# Patient Record
Sex: FEMALE | Race: WHITE | Employment: OTHER | URBAN - NONMETROPOLITAN AREA
[De-identification: names, ages, dates, MRNs, and addresses within clinical notes are randomized per-mention and may not be internally consistent; named-entity substitution may affect disease eponyms.]

---

## 2022-07-19 NOTE — PROGRESS NOTES
[facility] initial consultation note    Karen Kirby                                                Chief complaint: Karen Kirby is a 61 y. o.oldfemale came for further evaluation regarding her sleep apnea  with referral from Ms. Mauricio Guevara,    Patient underwent sleep studies at [facility] AT THE [facility] sleep lab. [place]:    Sleep/Wake schedule:  Usual time to go to bed during the work/regular day of week: 10 Am to 11AM  Usual time to wake up during the work//regular day of week : 7AM  Over the weekends her sleep schedule:   [x]phase delayed- Occasionally. She usually falls a sleep in less than: Varies from 30 to 60minutes  She takes naps: Yes. Number of naps per week:  2 to 3 times  During each nap she spends a total of:  1hour  The naps were reported as refreshing: Yes    Sleep Hygiene:    Is the temperature and evironment in her bed room is acceptable to her: Yes. She watches Television in her bed room: No.  She read books, study, pay bills etc in the bed: No.  Frequency She wake up during night/sleep: 1 to 2  Majority of nocturnal awakenings are for urination: Yes. Difficulty in falling back to sleep after nocturnal awakenings: No    Do you drink coffee: She rarely drinks 1 cup of coffee in the morning. She drinks 1 cup/week on average. Do you drink caffeinated beverages i.e sodas: No.    Do you drink tea: Yes. Occasionally she drinks green tea. Do you drink alcoholic beverages: She drinks alcohol rarely I.e 1-2 drinks per weekend      History of recreational drug use: No.     Social History     Tobacco Use    Smoking status: Never    Smokeless tobacco: Never   Substance Use Topics    Alcohol use: Yes     Comment: Occasional    Drug use: No       Sleep apnea symptoms:    Shewas diagnosed with sleep apnea in the past.  Please see diagnostic data section for details.     History of headaches in the morning:No.  History of dry mouth in the morning: Yes. She is currently using ResMed fullface mask. She is using AirFit F 20 small size fullface mask. History of palpitations during night time/nocturnal awakenings: No.  History of sweating during night time/nocturnal awakenings: Yes- Occasionally    General:  History of head injury in the past: No.    History of seizures: NO.  Rest less legs syndrome symptoms:NO  History suggestive of periodic limb movements during sleep: NO  History suggestive of hypnagogic hallucinations: NO  History suggestive of hypnopompic hallucinations: NO  History suggestive of sleep talking: NO  History suggestive of sleep walking:NO  History suggestive of bruxism: NO   History suggestive of cataplexy: NO  History suggestive of sleep paralysis:NO    Family history of sleep disorders:  Family history of obstructive sleep apnea: Yes. Her mother was diagnosed with obstructive sleep apnea. Her mother used to be on treatment with a CPAP device. She is currently not using her CPAP device. Her brother was also diagnosed with obstructive sleep apnea. He is currently using a CPAP machine. Family history of Narcolepsy: No.   Family history of Rest less legs syndrome : No.       History regarding old sleep studies:  Prior history of sleep study: Yes. Please see the diagnostic data section for details. Using CPAP device: No.  Currently using home Oxygen: NO.      Patient considerations:  Is the patient is ambulatory: Yes  Patient is currently using: None of these Wheelchair, Lynnda Leventhal or Tashia Keita. Para/Quadriplegic: NO  Hearing deficit : NO  Claustrophobic: NO  MDD : NO  Blind: NO  Incontinent: NO  Para/Quadraplegi: NO.   Need transportation to and from Sleep Center:NO    Social History:  Social History     Tobacco Use    Smoking status: Never    Smokeless tobacco: Never   Substance Use Topics    Alcohol use: Yes     Comment: Occasional    Drug use: No   .  She is currently working: Yes.   She works in [employer] with 3 hours/day. 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 Past Medical History:   Diagnosis Date    Hyperlipidemia     Hypertension        Past Surgical History:   Procedure Laterality Date    Ef[place]    COLONOSCOPY  10/21/13    DILATION AND CURETTAGE OF UTERUS      UPPER GASTROINTESTINAL ENDOSCOPY  10/21/13       No Known Allergies    Current Outpatient Medications   Medication Sig Dispense Refill    valsartan (DIOVAN) 80 MG tablet Take 80 mg by mouth daily      montelukast (SINGULAIR) 10 MG tablet Take 10 mg by mouth nightly      Omega 3 1000 MG CAPS Take by mouth      vitamin D3 (CHOLECALCIFEROL) 10 MCG (400 UNIT) TABS tablet Take 400 Units by mouth daily 25mcg      Multiple Vitamin (MULTI-DAY PO) Take by mouth      aspirin 81 MG EC tablet Take 81 mg by mouth daily      Homeopathic Products (RESTFUL LEGS PM SL) Place under the tongue      calcium carbonate (OSCAL) 500 MG TABS tablet Take 600 mg by mouth daily      Aloe Vera 99 % GEL Apply topically      MELATONIN PO Take by mouth      Azelastine HCl (ASTEPRO NA) by Nasal route as needed. Boswellia-Glucosamine-Vit D (OSTEO BI-FLEX/5-LOXIN ADVANCED PO) Take  by mouth daily. omeprazole (PRILOSEC) 40 MG capsule Take 1 capsule by mouth daily for 30 days. 30 capsule 3     No current facility-administered medications for this visit. Family History   Problem Relation Age of Onset    Diabetes Mother     Heart Disease Mother     Heart Disease Father         Review of Systems:    General/Constitutional: She gained 14 pounds of weight from her CPAP titration study performed on 30 December 2021 with a normal appetite. No fever or chills. HENT: Negative. Eyes: Negative. Upper respiratory tract: Frequent nasal stuffiness with post nasal drip. She is currently using Astelin nasal spray twice daily with good compliance.   She used to be on treatment with Nasacort nasal spray from over-the-counter with no improvement in her symptoms. She quit taking Nasacort nasal spray. Lower respiratory tract/ lungs: No cough or sputum production. No hemoptysis. Cardiovascular: No palpitations or chest pain. Gastrointestinal: No nausea or vomiting. Neurological: No focal neurologiacal weakness. Extremities: No edema. Musculoskeletal: No complaints. Genitourinary: No complaints. Hematological: Negative. Psychiatric/Behavioral: Negative. Skin: No itching. /68 (Site: Left Upper Arm, Position: Sitting, Cuff Size: Large Adult)   Pulse 74   Temp 97.8 °F (36.6 °C)   Ht 5' 4\" (1.626 m)   Wt 184 lb (83.5 kg)   SpO2 98% Comment: room air at rest  BMI 31.58 kg/m²   BMI:  Body mass index is 31.58 kg/m². Mallampati airway Class:7  Neck Circumference:.14 Inches  Pax sleepiness score 8/18/22: 10  Sleep apnea quality of life questionnaire: 80    Physical Exam:  Nursing note and vitals reviewed. Constitutional: Patient appears moderately built and moderately nourished. No distress. Patient is oriented to person, place, and time. HENT: Hypertrophied nasal turbinates with pale nasal mucosa bilaterally. Slight deviated nasal septum to the left side. Head: Normocephalic and atraumatic. Right Ear: External ear normal.   Left Ear: External ear normal.   Mouth/Throat: Oropharynx is clear and moist.  No oral thrush. Eyes: Conjunctivae are normal. Pupils are equal, round, and reactive to light. No scleral icterus. Neck: Neck supple. No JVD present. No tracheal deviation present. Cardiovascular: Normal rate, regular rhythm, normal heart sounds. No murmur heard. Pulmonary/Chest: Effort normal and breath sounds normal. No stridor. No respiratory distress. No wheezes. No rales. Patient exhibits no tenderness. Abdominal: Soft. Patient exhibits no distension. No tenderness. Musculoskeletal: Normal range of motion. Extremities: Patient exhibits no edema and no tenderness.    Lymphadenopathy:  No cervical adenopathy. Neurological: Patient is alert and oriented to person, place, and time. Skin: Skin is warm and dry. Patient is not diaphoretic. Psychiatric: Patient  has a normal mood and affect. Patient behavior is normal.     Diagnostic Data:      Sleep test: Performed on 14 December 2021 at [facility] AT THE [facility] sleep lab                            CPAP test: Performed on 10 December 2021                          PAP Download:   Recorded compliance dates:6/11/22-8/15/22  More than 4hour usage compliance was:68.9%. Average residual Apnea- Hypoapnea index on current pressue was:0.8. PAP Type cpap   Level :13  Ramp pressure: 6 cm of water   average usuage hours per day was:.[id number]     Interface: full     Provider:  []GAL             [x]Jaquelin                        []Mary          []George                           [][facility]      []Othe          Assessment:  -She was diagnosed with mild obstructive sleep apnea by a baseline sleep study performed on 14 December 2021. She was subsequently titrated to a CPAP pressure of 13 cm of water. She is currently using CPAP with 13 cm of water pressure. She is using her PAP device with ? poor compliance for >4hours (her download was reported for 90 days out of 90 days that dates were included up to 5 September 2022) but benefiting from it. She denies any clinical symptoms.  -Inadequate sleep hygiene.  -Essential hypertension on treatment medications under control.  -Allergic rhinitis on treatment with Singulair 10 mg p.o. nightly and Astelin nasal spray- not under control  -GERD on treatment with PPI  -Hiatal hernia. -Deviated nasal septum to the left side. Recommendations/Plan:  -Will request download from her DME company for the last 1 month to document her compliance for more than 4 hours.  -Continue Astelin 0.1% solution ( 137mcg/ spray) 2 sprays each nostril bid.  -Start patient on Flonase 50mcg/INH 2sprays each nostril daily.  She  was informed about adverse effects of Flonase. She was demonstrated in my office how to use Flonase. She verbalizes understanding.  -Patient will be given a prescription for chin strap to use with her current mask to avoid leaks and to improve her dryness of mouth.  -Schedule patient for Ear, Nose and Throat specialist consultation with Dr. Zechariah Cuellar Specialist at [facility] as soon as possible for further evaluation of uncontrolled allergic rhinitis with persistent postnasal drip and deviated nasal septum to the left side. She is having difficulty in using her CPAP machine.  -She was advised to continue current positive airway pressure therapy with above described pressure.  -She advised to keep good compliance with current recommended pressure to get optimal results and clinical improvement.  -Follow with my clinic in 3 to 4months for clinical reevaluation with review of download. -She was advised to call [employer] regarding supplies if needed.  -She was advised to practice good sleep hygiene techniques. She was provided with a hand out. -She was advised to loose weight by controlling diet and doing exercise.  -She was instructed to not to drive any motor vehicles or operate heavy equipment if she feels sleepy. -She was advised call my office for earlier appointment if needed for worsening of sleep symptoms.  -Doug Will educated about my impression and plan. Patient verbalizes understanding.      -I personally reviewed updated the Past medical hx, Past surgical hx,Social hx, Family hx, Medications, Allergies in the discrete data section of the patient chart along with labs, Pulmonary medicine,Sleep medicine related, Pathological, Microbiological and Radiological investigations. Addendum done on 8/18/22 at 2:51 PM EDT:  Diagnostic Data:  PAP Download:   Recorded compliance dates: From 17 July 2022- 18 August 2022.   More than 4hour usage compliance was: 93.9%  Average residual Apnea- Hypoapnea index on current pressue was: 0.7      PAP Type: 13 cm of water  Ramp pressure: 6 cm of water    Average usuage hours per day was: 7 hours 43 minutes    Interface: ResMed fullface mask    Provider:  []GAL  [x]Jaquelin  []Mary  []George         [][facility] []Other:       Plan: Will keep above follow-up appointment with my clinic as ordered.

## 2022-08-18 ENCOUNTER — INITIAL CONSULT (OUTPATIENT)
Dept: PULMONOLOGY | Age: 57
End: 2022-08-18
Payer: COMMERCIAL

## 2022-08-18 VITALS
SYSTOLIC BLOOD PRESSURE: 118 MMHG | BODY MASS INDEX: 31.41 KG/M2 | WEIGHT: 184 LBS | OXYGEN SATURATION: 98 % | HEIGHT: 64 IN | HEART RATE: 74 BPM | TEMPERATURE: 97.8 F | DIASTOLIC BLOOD PRESSURE: 68 MMHG

## 2022-08-18 DIAGNOSIS — G47.33 OSA ON CPAP: ICD-10-CM

## 2022-08-18 DIAGNOSIS — G47.10 HYPERSOMNIA: ICD-10-CM

## 2022-08-18 DIAGNOSIS — J34.2 DNS (DEVIATED NASAL SEPTUM): ICD-10-CM

## 2022-08-18 DIAGNOSIS — J30.9 ALLERGIC RHINITIS, UNSPECIFIED SEASONALITY, UNSPECIFIED TRIGGER: Primary | ICD-10-CM

## 2022-08-18 DIAGNOSIS — Z99.89 OSA ON CPAP: ICD-10-CM

## 2022-08-18 PROCEDURE — 99204 OFFICE O/P NEW MOD 45 MIN: CPT | Performed by: INTERNAL MEDICINE

## 2022-08-18 RX ORDER — CALCIUM CARBONATE 500(1250)
600 TABLET ORAL DAILY
COMMUNITY

## 2022-08-18 RX ORDER — MONTELUKAST SODIUM 10 MG/1
10 TABLET ORAL NIGHTLY
COMMUNITY

## 2022-08-18 RX ORDER — FLUTICASONE PROPIONATE 50 MCG
2 SPRAY, SUSPENSION (ML) NASAL DAILY
Qty: 16 G | Refills: 11 | Status: SHIPPED | OUTPATIENT
Start: 2022-08-18 | End: 2023-08-18

## 2022-08-18 RX ORDER — ASPIRIN 81 MG/1
81 TABLET ORAL DAILY
COMMUNITY

## 2022-08-18 RX ORDER — VALSARTAN 80 MG/1
80 TABLET ORAL DAILY
COMMUNITY

## 2022-08-18 RX ORDER — OMEGA-3 FATTY ACIDS/FISH OIL 300-1000MG
CAPSULE ORAL
COMMUNITY

## 2022-08-18 RX ORDER — OMEGA-3S/DHA/EPA/FISH OIL/D3 300MG-1000
400 CAPSULE ORAL DAILY
COMMUNITY

## 2022-08-18 NOTE — PATIENT INSTRUCTIONS
Recommendations/Plan:  -Will request download from her DME company for the last 1 month to document her compliance for more than 4 hours.  -Continue Astelin 0.1% solution ( 137mcg/ spray) 2 sprays each nostril bid.  -Start patient on Flonase 50mcg/INH 2sprays each nostril daily. She  was informed about adverse effects of Flonase. She was demonstrated in my office how to use Flonase. She verbalizes understanding.  -Patient will be given a prescription for chin strap to use with her current mask to avoid leaks and to improve her dryness of mouth.  -Schedule patient for Ear, Nose and Throat specialist consultation with Dr. Sahra Price Specialist at [facility] as soon as possible for further evaluation of uncontrolled allergic rhinitis with persistent postnasal drip and deviated nasal septum to the left side. She is having difficulty in using her CPAP machine.  -She was advised to continue current positive airway pressure therapy with above described pressure.  -She advised to keep good compliance with current recommended pressure to get optimal results and clinical improvement.  -Follow with my clinic in 3 to 4months for clinical reevaluation with review of download. -She was advised to call [employer] regarding supplies if needed.  -She was advised to practice good sleep hygiene techniques. She was provided with a hand out. -She was advised to loose weight by controlling diet and doing exercise.  -She was instructed to not to drive any motor vehicles or operate heavy equipment if she feels sleepy. -She was advised call my office for earlier appointment if needed for worsening of sleep symptoms.  -Althea Vincent educated about my impression and plan. Patient verbalizes understanding.

## 2022-08-18 NOTE — PROGRESS NOTES
Chief Complaint: Yulia Hobbs is a new sleep consult with download     Mallampati airway Class:7  Neck Circumference:.14 Inches    Wesley sleepiness score 8/18/22:   Sleep apnea quality of life questionnaire:      Diagnostic Data:   PAP Download:   Recorded compliance dates:6/11/22-8/15/22  More than 4hour usage compliance was:68.9%. Average residual Apnea- Hypoapnea index on current pressue was:0.8.       PAP Type cpap   Level  6/13     Average usuage hours per day was:.[id number]    Interface: full    Provider:  []-MATTHEW  [x]Jaquelin  []Mary  []George         [][facility] []Other:

## 2022-09-09 DIAGNOSIS — Z99.89 OSA ON CPAP: ICD-10-CM

## 2022-09-09 DIAGNOSIS — G47.10 HYPERSOMNIA: ICD-10-CM

## 2022-09-09 DIAGNOSIS — J30.9 ALLERGIC RHINITIS, UNSPECIFIED SEASONALITY, UNSPECIFIED TRIGGER: ICD-10-CM

## 2022-09-09 DIAGNOSIS — J34.2 DNS (DEVIATED NASAL SEPTUM): ICD-10-CM

## 2022-09-09 DIAGNOSIS — G47.33 OSA ON CPAP: ICD-10-CM

## 2022-09-13 RX ORDER — FLUTICASONE PROPIONATE 50 MCG
SPRAY, SUSPENSION (ML) NASAL
Refills: 11 | OUTPATIENT
Start: 2022-09-13

## 2022-12-11 NOTE — PROGRESS NOTES
[facility] [address] Follow up note    Victor Hugo Mcduffie                                             Chief complaint and [place]: Victor Hugo Mcduffie is a 61 y. o.oldfemale came for follow up regarding her sleep apnea. She is currently using her positive airway pressure device with an auto CPAP with a minimal CPAP pressure of 5cm of water and maximum CPAP pressure of 12 cm of water with Reslex of 3. She is currently using a Reanna device. She denies any problems with machine or mask. She is currently using a nasal mask. She is not using her chinstrap. She is waking up in the morning with a dry eyes due to exhaust air from her mask directed towards her eyes. She is sleeping well at night with out difficulty. She denies any daytime sleepiness. She was evaluated by Dr. Idalia Vergara MD from ENT service. She underwent septoplasty with bilateral turbinoplasties and Clarifix procedure by MD Mindy on 14 November 2022. Review of Systems:   General/Constitutional: she lost 7lbs of weight from the last visit with normal appetite. No fever or chills. HENT: Negative. Eyes: Negative. Upper respiratory tract: No nasal stuffiness or post nasal drip. Lower respiratory tract/ lungs: No cough or sputum production. No hemoptysis. Cardiovascular: No palpitations or chest pain. Gastrointestinal: No nausea or vomiting. Neurological: No focal neurologiacal weakness. Extremities: No edema. Musculoskeletal: No complaints. Genitourinary: No complaints. Hematological: Negative. Psychiatric/Behavioral: Negative. Skin: No itching.       Social History     Tobacco Use    Smoking status: Never    Smokeless tobacco: Never   Substance Use Topics    Alcohol use: Yes     Comment: Occasional    Drug use: No       Social History:  Social History     Tobacco Use    Smoking status: Never    Smokeless tobacco: Never   Substance Use Topics    Alcohol use: Yes     Comment: Occasional Drug use: No   .  She is currently working: Yes. She works in [employer] with 3 hours/day. 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 Past Medical History:   Diagnosis Date    Hyperlipidemia     Hypertension        Past Surgical History:   Procedure Laterality Date    Kerrie Kowalski    COLONOSCOPY  10/21/13    DILATION AND CURETTAGE OF UTERUS      UPPER GASTROINTESTINAL ENDOSCOPY  10/21/13       No Known Allergies    Current Outpatient Medications   Medication Sig Dispense Refill    valsartan (DIOVAN) 80 MG tablet Take 80 mg by mouth daily      Omega 3 1000 MG CAPS Take by mouth      vitamin D3 (CHOLECALCIFEROL) 10 MCG (400 UNIT) TABS tablet Take 400 Units by mouth daily 25mcg      Multiple Vitamin (MULTI-DAY PO) Take by mouth      aspirin 81 MG EC tablet Take 81 mg by mouth daily      Homeopathic Products (RESTFUL LEGS PM SL) Place under the tongue      calcium carbonate (OSCAL) 500 MG TABS tablet Take 600 mg by mouth daily      Aloe Vera 99 % GEL Apply topically      MELATONIN PO Take by mouth      fluticasone (FLONASE) 50 MCG/ACT nasal spray 2 sprays by Nasal route daily 16 g 11    Azelastine HCl (ASTEPRO NA) by Nasal route as needed. [place]well[place]-Glucosamine-Vit D (OSTEO BI-FLEX/5-LOXIN ADVANCED PO) Take  by mouth daily. montelukast (SINGULAIR) 10 MG tablet Take 10 mg by mouth nightly (Patient not taking: Reported on 1/5/2023)       No current facility-administered medications for this visit. Family History   Problem Relation Age of Onset    Diabetes Mother     Heart Disease Mother     Heart Disease Father           /78 (Site: Left Upper Arm, Position: Sitting, Cuff Size: Medium Adult)   Pulse 71   Temp 97.6 °F (36.4 °C)   Ht 5' 3\" (1.6 m)   Wt 177 lb (80.3 kg)   SpO2 100% Comment: room air at rest  BMI 31.35 kg/m²   BMI:  Body mass index is 31.35 kg/m². Mallampati airway Class:4  Neck Circumference:. 14Inches  Kaiden sleepiness score 1/5/23: 3  Sleep apnea quality of life questionnaire:89    Physical Exam:  Nursing note and vitals reviewed. Constitutional: Patient appears moderately built and moderately nourished. No distress. Patient is oriented to person, place, and time. HENT:   Head: Normocephalic and atraumatic. Right Ear: External ear normal.   Left Ear: External ear normal.   Mouth/Throat: Oropharynx is clear and moist.  No oral thrush. Eyes: Conjunctivae are normal. Pupils are equal, round, and reactive to light. No scleral icterus. Neck: Neck supple. No JVD present. No tracheal deviation present. Cardiovascular: Normal rate, regular rhythm, normal heart sounds. No murmur heard. Pulmonary/Chest: Effort normal and breath sounds normal. No stridor. No respiratory distress. No wheezes. No rales. Patient exhibits no tenderness. Abdominal: Soft. Patient exhibits no distension. No tenderness. Musculoskeletal: Normal range of motion. Extremities: Patient exhibits no edema and no tenderness. Lymphadenopathy:  No cervical adenopathy. Neurological: Patient is alert and oriented to person, place, and time. Skin: Skin is warm and dry. Patient is not diaphoretic. Psychiatric: Patient  has a normal mood and affect. Patient behavior is normal.     Diagnostic Data:    Sleep test: Performed on 14 December 2021 at [facility] AT THE [facility] sleep lab    CPAP test: Performed on 10 December 2021                     PAP Download:   Recorded compliance dates:12/5/22-1/2/23  More than 4hour usage compliance was:86.2%. Average residual Apnea- Hypoapnea index on current pressue was:0.9.        PAP Type cpap   Level  auto with a minimal CPAP pressure of 5 cm of water and maximum CPAP pressure of 12 cm of water     Average usuage hours per day was:.5 hours 46 mins            Interface: nasal     Provider:  []SR-HME             [x]Jaquelin                        []Mary          []George                           [][facility] []Other:     Average usage hours: 5 hours 46 minutes  The average high leak time: 0 minutes  Average leak: 0.6 L/min      Assessment:  -She was diagnosed with mild obstructive sleep apnea by a baseline sleep study performed on 14 December 2021. She is currently using an auto CPAP with a minimal CPAP pressure of 5cm of water and maximum CPAP pressure of 12 cm of water with Reslex of 3. She is using her auto CPAP with an excellent compliance for more than 4 hours of CPAP therapy. She is benefiting from it. She denies any clinical symptoms.  -Essential hypertension on treatment medications under control.  -Allergic rhinitis - under control  -GERD on treatment with PPI  -Hiatal hernia. -Deviated nasal septum to the left side. S/p septoplasty with bilateral turbinoplasties and Clarifix procedure by MD Mindy on 14 November 2022.  -She denied any prior history of COPD, CHF, Obesity hypoventilation, Prior palate surgery and Central sleep apnea. Recommendations/Plan:  -Continue Nasacort nasal spray 2sprays each nostril daily. She was informed about adverse effects of Nasacort. She was demonstrated in my office how to useNasacort. She verbalizes understanding.  -Patient did not want to go for repeat baseline sleep study/home sleep study to check the current status of sleep apnea after having nasal surgery by her ENT specialist.  She want to continue with her current CPAP therapy as prescribed.  -She will be referred to [facility]( [facility] [employer] [employer] [place] for mask refit with a different style mask for better compliance, comfort and to prevent dryness of eyes. -She was advised to make early appointment with my clinic if she develops any of the following conditions including -> COPD, CHF,Obesity hypoventilation syndrome, undergo palate surgery and Central sleep apnea her Auto CPAP/BiPAP settings to a fixed CPAP/BiPAP pressure settings.  She verbalizes understanding.  -Patient will be given a prescription for chin strap to use with her current mask to avoid leaks and to improve her dryness of mouth.  -She was advised to continue current positive airway pressure therapy with above described pressure.  -She advised to keep good compliance with current recommended pressure to get optimal results and clinical improvement.  -She was advised to call [employer] regarding supplies if needed.  -She was advised to loose weight by controlling diet and doing exercise.  -She was instructed to extend her sleep schedule to 7 to 9 hours in a given 24hour period continuously. -Follow with my clinic in 12months for clinical reevaluation with review of download. -She was advised call my office for earlier appointment if needed for worsening of sleep symptoms.  -Gwendolyn Wren educated about my impression and plan. Patient verbalizes understanding.      -I personally reviewed updated the Past medical hx, Past surgical hx,Social hx, Family hx, Medications, Allergies in the discrete data section of the patient chart along with labs, Pulmonary medicine,Sleep medicine related, Pathological, Microbiological and Radiological investigations.

## 2023-01-05 ENCOUNTER — OFFICE VISIT (OUTPATIENT)
Dept: PULMONOLOGY | Age: 58
End: 2023-01-05
Payer: COMMERCIAL

## 2023-01-05 ENCOUNTER — TELEPHONE (OUTPATIENT)
Dept: PULMONOLOGY | Age: 58
End: 2023-01-05

## 2023-01-05 VITALS
HEIGHT: 63 IN | BODY MASS INDEX: 31.36 KG/M2 | TEMPERATURE: 97.6 F | WEIGHT: 177 LBS | OXYGEN SATURATION: 100 % | SYSTOLIC BLOOD PRESSURE: 122 MMHG | DIASTOLIC BLOOD PRESSURE: 78 MMHG | HEART RATE: 71 BPM

## 2023-01-05 DIAGNOSIS — J30.9 ALLERGIC RHINITIS, UNSPECIFIED SEASONALITY, UNSPECIFIED TRIGGER: Primary | ICD-10-CM

## 2023-01-05 DIAGNOSIS — G47.33 OSA ON CPAP: ICD-10-CM

## 2023-01-05 DIAGNOSIS — Z99.89 OSA ON CPAP: ICD-10-CM

## 2023-01-05 PROCEDURE — 99214 OFFICE O/P EST MOD 30 MIN: CPT | Performed by: INTERNAL MEDICINE

## 2023-01-05 RX ORDER — TRIAMCINOLONE ACETONIDE 55 UG/1
2 SPRAY, METERED NASAL DAILY
Qty: 1 EACH | Refills: 11
Start: 2023-01-05

## 2023-01-05 NOTE — PATIENT INSTRUCTIONS
Recommendations/Plan:  -Continue Nasacort nasal spray 2sprays each nostril daily. She was informed about adverse effects of Nasacort. She was demonstrated in my office how to useNasacort. She verbalizes understanding.  -She will be referred to [employer]( [facility] [address]) [employer] for mask refit with a different style mask for better compliance, comfort and to prevent dryness of eyes. -She was advised to make early appointment with my clinic if she develops any of the following conditions including -> COPD, CHF,Obesity hypoventilation syndrome, undergo palate surgery and Central sleep apnea her Auto CPAP/BiPAP settings to a fixed CPAP/BiPAP pressure settings. She verbalizes understanding.  -Patient will be given a prescription for chin strap to use with her current mask to avoid leaks and to improve her dryness of mouth.  -She was advised to continue current positive airway pressure therapy with above described pressure.  -She advised to keep good compliance with current recommended pressure to get optimal results and clinical improvement.  -She was advised to call [employer] regarding supplies if needed.  -She was advised to loose weight by controlling diet and doing exercise.  -She was instructed to extend her sleep schedule to 7 to 9 hours in a given 24hour period continuously. -Follow with my clinic in 12months for clinical reevaluation with review of download. -She was advised call my office for earlier appointment if needed for worsening of sleep symptoms.  -Derrick Chauhan educated about my impression and plan. Patient verbalizes understanding.

## 2023-01-05 NOTE — PROGRESS NOTES
Chief Complaint: Ezra Oneal is here with download    Mallampati airway Class:4  Neck Circumference:. 14Inches    Norton sleepiness score 1/5/23: .  Sleep apnea quality of life questionnaire:      Diagnostic Data:   PAP Download:   Recorded compliance dates:12/5/22-1/2/23  More than 4hour usage compliance was:86.2%. Average residual Apnea- Hypoapnea index on current pressue was:0.9.       PAP Type cpap   Level  auto     Average usuage hours per day was:.5 hours 46 mins     Interface: nasal    Provider:  [][facility]  [x]Jaquelin  []Mary  []George         [][facility] []Other:

## 2023-01-05 NOTE — PROGRESS NOTES
Chief Complaint: Serenity Like is here with download    Mallampati airway Class:4  Neck Circumference:. 14Inches    [place] sleepiness score 1/5/23:   Sleep apnea quality of life questionnaire:      Diagnostic Data:   PAP Download:   Recorded compliance dates:11/22/22-12/21/22  More than 4hour usage compliance was 53.3%. Average residual Apnea- Hypoapnea index on current pressue was:. 0.4      PAP Type cpap   Level  auto     Average usuage hours per day was:.[id number]     Interface:     Provider:  []GAL  [x]Jaquelin  []Mary  []George         [][facility] []Other: